# Patient Record
Sex: MALE | Race: BLACK OR AFRICAN AMERICAN | NOT HISPANIC OR LATINO | Employment: FULL TIME | URBAN - METROPOLITAN AREA
[De-identification: names, ages, dates, MRNs, and addresses within clinical notes are randomized per-mention and may not be internally consistent; named-entity substitution may affect disease eponyms.]

---

## 2022-05-14 ENCOUNTER — HOSPITAL ENCOUNTER (EMERGENCY)
Facility: HOSPITAL | Age: 62
Discharge: HOME | End: 2022-05-14
Attending: EMERGENCY MEDICINE | Admitting: EMERGENCY MEDICINE

## 2022-05-14 VITALS
HEART RATE: 58 BPM | WEIGHT: 205.03 LBS | TEMPERATURE: 98.5 F | BODY MASS INDEX: 27.17 KG/M2 | RESPIRATION RATE: 16 BRPM | DIASTOLIC BLOOD PRESSURE: 88 MMHG | HEIGHT: 73 IN | OXYGEN SATURATION: 98 % | SYSTOLIC BLOOD PRESSURE: 154 MMHG

## 2022-05-14 DIAGNOSIS — H20.9 TRAUMATIC IRITIS: Primary | ICD-10-CM

## 2022-05-14 PROCEDURE — 99283 EMERGENCY DEPT VISIT LOW MDM: CPT

## 2022-05-14 RX ORDER — PREDNISOLONE ACETATE 10 MG/ML
1 SUSPENSION/ DROPS OPHTHALMIC 4 TIMES DAILY
Qty: 5 ML | Refills: 0 | Status: SHIPPED | OUTPATIENT
Start: 2022-05-14

## 2022-05-14 RX ORDER — PROPARACAINE HYDROCHLORIDE 5 MG/ML
2 SOLUTION/ DROPS OPHTHALMIC ONCE
Status: COMPLETED | OUTPATIENT
Start: 2022-05-14 | End: 2022-05-14

## 2022-05-14 RX ORDER — CYCLOPENTOLATE HYDROCHLORIDE 20 MG/ML
1 SOLUTION/ DROPS OPHTHALMIC 2 TIMES DAILY
Qty: 2 ML | Refills: 0 | Status: SHIPPED | OUTPATIENT
Start: 2022-05-14

## 2022-05-14 RX ADMIN — PROPARACAINE HYDROCHLORIDE 2 DROP: 5 SOLUTION/ DROPS OPHTHALMIC at 15:26

## 2022-05-14 NOTE — DISCHARGE INSTRUCTIONS
Use the eye drops as prescribed, call the [facility] in [place] on Monday morning to set up an appointment for follow up. I spoke with Dr ordonez today who also advised if your symptoms become worse before then, to call the office number ([phone]) and they would arrange to see you at [facility].

## 2022-05-14 NOTE — ED PROVIDER NOTES
"Patient is 61 y.o. year old male that presents to the ED for evaluation of right eye pain.  The patient was struck in the right eye by a football approximately 1 week ago and has had persistent right eye pain along with photophobia.    Physical Exam     See nurses note for visual acuity  Lids reveal mild edema and tenderness  Conjunctiva is injected to the limbus with some chemosis present  Cornea reveals no sign of abrasion or foreign body  Anterior chamber reveals mild cell and flare  The pupils equal round and reactive        ED Course:    /88 (BP Location: Right arm, Patient Position: Sitting)   Pulse 58   Temp 98.5 °F (36.9 °C) (Oral)   Resp 16   Ht 185.4 cm (73\")   Wt 93 kg (205 lb 0.4 oz)   SpO2 98%   BMI 27.05 kg/m²   No results found for this or any previous visit.  Medications   proparacaine (ALCAINE) 0.5 % ophthalmic solution 2 drop (2 drops Right Eye Given by Other 5/14/22 1526)     No results found.    MDM: The patient was discussed with the [facility] ophthalmology apartment and the patient be discharged home on appropriate medications with follow-up in their clinic.  He appears to have acute traumatic iritis.  There is no hyphema noted.  Procedures      The case was discussed between the DONAVON and myself. Patient  care including, but not limited to ordered imaging, medications, and lab results were reviewed. I then performed the substantive portion of the visit including all aspects of the medical decision making.        Juan Carlos Ellsworth DO  15:46 EDT  05/14/22       Juan Carlos Ellsworth,   05/14/22 1546    "

## 2022-05-14 NOTE — ED PROVIDER NOTES
Subjective   Pt presents to ED with complaint of right eye pain. States he was hit in the eye with a football 6 days ago and since then he has had pain in his eye.          Review of Systems   Constitutional: Negative.    HENT: Negative.    Eyes: Positive for photophobia and pain.   Respiratory: Negative.    Cardiovascular: Negative.    Gastrointestinal: Negative.    Endocrine: Negative.    Genitourinary: Negative.    Musculoskeletal: Negative.    Skin: Negative.    Allergic/Immunologic: Negative.    Neurological: Negative.    Hematological: Negative.    Psychiatric/Behavioral: Negative.        No past medical history on file.    No Known Allergies    No past surgical history on file.    No family history on file.    Social History     Socioeconomic History   • Marital status:            Objective   Physical Exam  Constitutional:       Appearance: Normal appearance.   HENT:      Head: Normocephalic and atraumatic.      Nose: Nose normal.      Mouth/Throat:      Mouth: Mucous membranes are moist.   Eyes:      General: Lids are everted, no foreign bodies appreciated. Vision grossly intact. Gaze aligned appropriately.      Intraocular pressure: Right eye pressure is 8 mmHg. Left eye pressure is 8 mmHg.      Extraocular Movements: Extraocular movements intact.      Conjunctiva/sclera:      Right eye: Right conjunctiva is injected. Chemosis present.      Pupils: Pupils are equal, round, and reactive to light.      Slit lamp exam:     Right eye: Anterior chamber quiet. Photophobia present. No corneal flare, corneal ulcer, foreign body, hyphema or anterior chamber flares.      Left eye: Anterior chamber quiet.      Comments: Lids: Mild edema, tender  Conjunctiva: injection to limbus, chemosis  A/C: no hyphema, ? a few cells, no flare Pupil: reacts well  Cornea: clear   Cardiovascular:      Rate and Rhythm: Normal rate and regular rhythm.      Pulses: Normal pulses.   Pulmonary:      Effort: Pulmonary effort is  normal.      Breath sounds: Normal breath sounds.   Abdominal:      General: Abdomen is flat. Bowel sounds are normal.      Palpations: Abdomen is soft.   Musculoskeletal:         General: Normal range of motion.      Cervical back: Normal range of motion.   Skin:     General: Skin is warm and dry.      Capillary Refill: Capillary refill takes less than 2 seconds.   Neurological:      General: No focal deficit present.      Mental Status: He is alert and oriented to person, place, and time. Mental status is at baseline.   Psychiatric:         Mood and Affect: Mood normal.         Procedures           ED Course  ED Course as of 05/14/22 1559   Sat May 14, 2022   1541 Spoke with Dr ordonez with [facility] ophthalmology. Reviewed pt HPI and clinical findings. Advised will follow up in clinic early next week, will give pt contact information and instruct to call first thing Monday morning. Will send home with RX Cyclogyl and Pred Forte at his recommendation, and give strict return precautions on discharge. [TP]      ED Course User Index  [TP] Urvashi Garcia, AMBER                                                 MDM  Number of Diagnoses or Management Options  Traumatic iritis: minor  Risk of Complications, Morbidity, and/or Mortality  Presenting problems: low  Management options: low    Patient Progress  Patient progress: stable      Final diagnoses:   Traumatic iritis       ED Disposition  ED Disposition     ED Disposition   Discharge    Condition   Stable    Comment   --             [facility]  [address] [facility] [address] [phone]  Call   on Monday for a follow up appt (Dr ordonez)         Medication List      New Prescriptions    cyclopentolate 2 % ophthalmic solution  Commonly known as: CYCLOGYL  Administer 1 drop to the right eye 2 (Two) Times a Day.     prednisoLONE acetate 1 % ophthalmic suspension  Commonly known as: Pred Forte  Administer 1 drop to the right eye 4 (Four)  Times a Day.           Where to Get Your Medications      These medications were sent to [id number] - [place], [place] - [address] AT [facility] - [phone] [facility] [phone]   [address] [place] [address]    Hours: 24-hours Phone: [phone]   · cyclopentolate 2 % ophthalmic solution  · prednisoLONE acetate 1 % ophthalmic suspension          Urvashi Garcia, APRN  05/14/22 1558